# Patient Record
Sex: MALE | Race: WHITE | NOT HISPANIC OR LATINO | Employment: PART TIME | URBAN - METROPOLITAN AREA
[De-identification: names, ages, dates, MRNs, and addresses within clinical notes are randomized per-mention and may not be internally consistent; named-entity substitution may affect disease eponyms.]

---

## 2023-03-06 ENCOUNTER — TELEPHONE (OUTPATIENT)
Dept: PRIMARY CARE | Facility: CLINIC | Age: 73
End: 2023-03-06
Payer: MEDICARE

## 2023-04-05 ENCOUNTER — TELEPHONE (OUTPATIENT)
Dept: PRIMARY CARE | Facility: CLINIC | Age: 73
End: 2023-04-05
Payer: MEDICARE

## 2023-04-05 DIAGNOSIS — E78.2 MIXED HYPERLIPIDEMIA: Primary | ICD-10-CM

## 2023-04-05 RX ORDER — ATORVASTATIN CALCIUM 20 MG/1
20 TABLET, FILM COATED ORAL DAILY
Qty: 90 TABLET | Refills: 2 | Status: SHIPPED | OUTPATIENT
Start: 2023-04-05 | End: 2024-01-30

## 2023-04-05 RX ORDER — ATORVASTATIN CALCIUM 20 MG/1
20 TABLET, FILM COATED ORAL DAILY
COMMUNITY
End: 2023-04-05 | Stop reason: SDUPTHER

## 2023-07-01 DIAGNOSIS — E11.9 TYPE 2 DIABETES MELLITUS WITHOUT COMPLICATION, WITHOUT LONG-TERM CURRENT USE OF INSULIN (MULTI): Primary | ICD-10-CM

## 2023-07-03 RX ORDER — METFORMIN HYDROCHLORIDE 500 MG/1
TABLET ORAL
Qty: 180 TABLET | Refills: 0 | Status: SHIPPED | OUTPATIENT
Start: 2023-07-03 | End: 2023-10-16 | Stop reason: SDUPTHER

## 2023-10-16 DIAGNOSIS — E11.9 TYPE 2 DIABETES MELLITUS WITHOUT COMPLICATION, WITHOUT LONG-TERM CURRENT USE OF INSULIN (MULTI): Primary | ICD-10-CM

## 2023-10-16 RX ORDER — SILDENAFIL 100 MG/1
100 TABLET, FILM COATED ORAL DAILY PRN
Qty: 10 TABLET | Refills: 1 | Status: SHIPPED | OUTPATIENT
Start: 2023-10-16 | End: 2023-10-23 | Stop reason: SDUPTHER

## 2023-10-16 RX ORDER — METFORMIN HYDROCHLORIDE 500 MG/1
500 TABLET ORAL 2 TIMES DAILY
Qty: 180 TABLET | Refills: 1 | Status: SHIPPED | OUTPATIENT
Start: 2023-10-16 | End: 2023-11-07 | Stop reason: SDUPTHER

## 2023-10-16 RX ORDER — SILDENAFIL 100 MG/1
100 TABLET, FILM COATED ORAL DAILY PRN
COMMUNITY
End: 2023-10-16 | Stop reason: SDUPTHER

## 2023-10-23 DIAGNOSIS — E11.9 TYPE 2 DIABETES MELLITUS WITHOUT COMPLICATION, WITHOUT LONG-TERM CURRENT USE OF INSULIN (MULTI): ICD-10-CM

## 2023-10-23 RX ORDER — SILDENAFIL 100 MG/1
100 TABLET, FILM COATED ORAL DAILY PRN
Qty: 10 TABLET | Refills: 1 | Status: SHIPPED | OUTPATIENT
Start: 2023-10-23

## 2023-10-24 PROBLEM — E11.9 DIABETES MELLITUS (MULTI): Status: ACTIVE | Noted: 2023-10-24

## 2023-11-07 ENCOUNTER — LAB (OUTPATIENT)
Dept: LAB | Facility: LAB | Age: 73
End: 2023-11-07
Payer: MEDICARE

## 2023-11-07 ENCOUNTER — OFFICE VISIT (OUTPATIENT)
Dept: PRIMARY CARE | Facility: CLINIC | Age: 73
End: 2023-11-07
Payer: MEDICARE

## 2023-11-07 VITALS
RESPIRATION RATE: 16 BRPM | HEIGHT: 67 IN | SYSTOLIC BLOOD PRESSURE: 104 MMHG | OXYGEN SATURATION: 96 % | DIASTOLIC BLOOD PRESSURE: 62 MMHG | HEART RATE: 72 BPM | BODY MASS INDEX: 26.21 KG/M2 | TEMPERATURE: 98 F | WEIGHT: 167 LBS

## 2023-11-07 DIAGNOSIS — E11.9 TYPE 2 DIABETES MELLITUS WITHOUT COMPLICATION, WITHOUT LONG-TERM CURRENT USE OF INSULIN (MULTI): ICD-10-CM

## 2023-11-07 DIAGNOSIS — Z00.00 ROUTINE GENERAL MEDICAL EXAMINATION AT HEALTH CARE FACILITY: ICD-10-CM

## 2023-11-07 DIAGNOSIS — Z12.5 SCREENING FOR PROSTATE CANCER: ICD-10-CM

## 2023-11-07 DIAGNOSIS — Z79.4 TYPE 2 DIABETES MELLITUS WITHOUT COMPLICATION, WITH LONG-TERM CURRENT USE OF INSULIN (MULTI): ICD-10-CM

## 2023-11-07 DIAGNOSIS — E11.9 TYPE 2 DIABETES MELLITUS WITHOUT COMPLICATION, WITH LONG-TERM CURRENT USE OF INSULIN (MULTI): Primary | ICD-10-CM

## 2023-11-07 DIAGNOSIS — E11.40 TYPE 2 DIABETES MELLITUS WITH DIABETIC NEUROPATHY, WITHOUT LONG-TERM CURRENT USE OF INSULIN (MULTI): ICD-10-CM

## 2023-11-07 DIAGNOSIS — Z79.4 TYPE 2 DIABETES MELLITUS WITHOUT COMPLICATION, WITH LONG-TERM CURRENT USE OF INSULIN (MULTI): Primary | ICD-10-CM

## 2023-11-07 DIAGNOSIS — E11.9 TYPE 2 DIABETES MELLITUS WITHOUT COMPLICATION, WITH LONG-TERM CURRENT USE OF INSULIN (MULTI): ICD-10-CM

## 2023-11-07 LAB
ALBUMIN SERPL BCP-MCNC: 4.4 G/DL (ref 3.4–5)
ALP SERPL-CCNC: 80 U/L (ref 33–136)
ALT SERPL W P-5'-P-CCNC: 26 U/L (ref 10–52)
ANION GAP SERPL CALC-SCNC: 10 MMOL/L (ref 10–20)
AST SERPL W P-5'-P-CCNC: 23 U/L (ref 9–39)
BILIRUB SERPL-MCNC: 0.8 MG/DL (ref 0–1.2)
BUN SERPL-MCNC: 24 MG/DL (ref 6–23)
CALCIUM SERPL-MCNC: 9.3 MG/DL (ref 8.6–10.3)
CHLORIDE SERPL-SCNC: 105 MMOL/L (ref 98–107)
CHOLEST SERPL-MCNC: 142 MG/DL (ref 0–199)
CHOLESTEROL/HDL RATIO: 2.4
CO2 SERPL-SCNC: 28 MMOL/L (ref 21–32)
CREAT SERPL-MCNC: 1.12 MG/DL (ref 0.5–1.3)
CREAT UR-MCNC: 172.6 MG/DL (ref 20–370)
ERYTHROCYTE [DISTWIDTH] IN BLOOD BY AUTOMATED COUNT: 12.4 % (ref 11.5–14.5)
GFR SERPL CREATININE-BSD FRML MDRD: 69 ML/MIN/1.73M*2
GLUCOSE SERPL-MCNC: 156 MG/DL (ref 74–99)
HCT VFR BLD AUTO: 43 % (ref 41–52)
HDLC SERPL-MCNC: 58.5 MG/DL
HGB BLD-MCNC: 14.3 G/DL (ref 13.5–17.5)
LDLC SERPL CALC-MCNC: 67 MG/DL
MCH RBC QN AUTO: 30.8 PG (ref 26–34)
MCHC RBC AUTO-ENTMCNC: 33.3 G/DL (ref 32–36)
MCV RBC AUTO: 93 FL (ref 80–100)
MICROALBUMIN UR-MCNC: 25.6 MG/L
MICROALBUMIN/CREAT UR: 14.8 UG/MG CREAT
NON HDL CHOLESTEROL: 84 MG/DL (ref 0–149)
NRBC BLD-RTO: 0 /100 WBCS (ref 0–0)
PLATELET # BLD AUTO: 180 X10*3/UL (ref 150–450)
POC HEMOGLOBIN A1C: 7.5 % (ref 4.2–6.5)
POTASSIUM SERPL-SCNC: 4.3 MMOL/L (ref 3.5–5.3)
PROT SERPL-MCNC: 6.8 G/DL (ref 6.4–8.2)
PSA SERPL-MCNC: 2.09 NG/ML
RBC # BLD AUTO: 4.65 X10*6/UL (ref 4.5–5.9)
SODIUM SERPL-SCNC: 139 MMOL/L (ref 136–145)
TRIGL SERPL-MCNC: 83 MG/DL (ref 0–149)
VLDL: 17 MG/DL (ref 0–40)
WBC # BLD AUTO: 4.8 X10*3/UL (ref 4.4–11.3)

## 2023-11-07 PROCEDURE — 1159F MED LIST DOCD IN RCRD: CPT | Performed by: FAMILY MEDICINE

## 2023-11-07 PROCEDURE — G0009 ADMIN PNEUMOCOCCAL VACCINE: HCPCS | Performed by: FAMILY MEDICINE

## 2023-11-07 PROCEDURE — 1170F FXNL STATUS ASSESSED: CPT | Performed by: FAMILY MEDICINE

## 2023-11-07 PROCEDURE — 83036 HEMOGLOBIN GLYCOSYLATED A1C: CPT | Performed by: FAMILY MEDICINE

## 2023-11-07 PROCEDURE — 3078F DIAST BP <80 MM HG: CPT | Performed by: FAMILY MEDICINE

## 2023-11-07 PROCEDURE — 80061 LIPID PANEL: CPT

## 2023-11-07 PROCEDURE — 1126F AMNT PAIN NOTED NONE PRSNT: CPT | Performed by: FAMILY MEDICINE

## 2023-11-07 PROCEDURE — 82043 UR ALBUMIN QUANTITATIVE: CPT

## 2023-11-07 PROCEDURE — G0008 ADMIN INFLUENZA VIRUS VAC: HCPCS | Performed by: FAMILY MEDICINE

## 2023-11-07 PROCEDURE — 3074F SYST BP LT 130 MM HG: CPT | Performed by: FAMILY MEDICINE

## 2023-11-07 PROCEDURE — 80053 COMPREHEN METABOLIC PANEL: CPT

## 2023-11-07 PROCEDURE — 36415 COLL VENOUS BLD VENIPUNCTURE: CPT

## 2023-11-07 PROCEDURE — 90662 IIV NO PRSV INCREASED AG IM: CPT | Performed by: FAMILY MEDICINE

## 2023-11-07 PROCEDURE — 1160F RVW MEDS BY RX/DR IN RCRD: CPT | Performed by: FAMILY MEDICINE

## 2023-11-07 PROCEDURE — G0103 PSA SCREENING: HCPCS

## 2023-11-07 PROCEDURE — 85027 COMPLETE CBC AUTOMATED: CPT

## 2023-11-07 PROCEDURE — 90677 PCV20 VACCINE IM: CPT | Performed by: FAMILY MEDICINE

## 2023-11-07 PROCEDURE — 82570 ASSAY OF URINE CREATININE: CPT

## 2023-11-07 PROCEDURE — G0439 PPPS, SUBSEQ VISIT: HCPCS | Performed by: FAMILY MEDICINE

## 2023-11-07 RX ORDER — METFORMIN HYDROCHLORIDE 1000 MG/1
1000 TABLET ORAL 2 TIMES DAILY
Qty: 180 TABLET | Refills: 2 | Status: SHIPPED | OUTPATIENT
Start: 2023-11-07 | End: 2023-11-29 | Stop reason: SDUPTHER

## 2023-11-07 RX ORDER — ASCORBIC ACID 500 MG
1500 TABLET ORAL DAILY
COMMUNITY

## 2023-11-07 ASSESSMENT — ENCOUNTER SYMPTOMS
NEUROLOGICAL NEGATIVE: 1
CONSTITUTIONAL NEGATIVE: 1
CARDIOVASCULAR NEGATIVE: 1
LOSS OF SENSATION IN FEET: 0
RESPIRATORY NEGATIVE: 1
ARTHRALGIAS: 1
OCCASIONAL FEELINGS OF UNSTEADINESS: 0
DEPRESSION: 0
GASTROINTESTINAL NEGATIVE: 1
MYALGIAS: 1
PSYCHIATRIC NEGATIVE: 1

## 2023-11-07 ASSESSMENT — ACTIVITIES OF DAILY LIVING (ADL)
TAKING_MEDICATION: INDEPENDENT
DOING_HOUSEWORK: INDEPENDENT
BATHING: INDEPENDENT
GROCERY_SHOPPING: INDEPENDENT
DRESSING: INDEPENDENT
MANAGING_FINANCES: INDEPENDENT

## 2023-11-07 ASSESSMENT — PATIENT HEALTH QUESTIONNAIRE - PHQ9
1. LITTLE INTEREST OR PLEASURE IN DOING THINGS: NOT AT ALL
SUM OF ALL RESPONSES TO PHQ9 QUESTIONS 1 AND 2: 0
2. FEELING DOWN, DEPRESSED OR HOPELESS: NOT AT ALL

## 2023-11-07 NOTE — PROGRESS NOTES
"Subjective   Reason for Visit: Wilfred Garces is an 73 y.o. male here for a Medicare Wellness visit.          Reviewed all medications by prescribing practitioner or clinical pharmacist (such as prescriptions, OTCs, herbal therapies and supplements) and documented in the medical record.    Patient doing okay no chest pain or shortness of breath.  Normal bowel bladder.  Still taking his meds for Lyme.  Patient has had pain in his foot.  At the ball he has calluses.  He is seeing podiatry in the past.  Patient doing okay with his medications.  He is staying active.  He rides his bike, he walks coughing as much as he can.  Minor aches and pains.        Patient Care Team:  Sterling Valentin DO as PCP - General  Sterling Valentin DO as PCP - [facility] Attributed Provider     Review of Systems   Constitutional: Negative.    HENT: Negative.     Respiratory: Negative.     Cardiovascular: Negative.    Gastrointestinal: Negative.    Genitourinary: Negative.    Musculoskeletal:  Positive for arthralgias and myalgias.   Neurological: Negative.    Psychiatric/Behavioral: Negative.         Objective   Vitals:  /62 (BP Location: Left arm, Patient Position: Sitting, BP Cuff Size: Adult)   Pulse 72   Temp 36.7 °C (98 °F)   Resp 16   Ht 1.708 m (5' 7.25\")   Wt 75.8 kg (167 lb)   SpO2 96%   BMI 25.96 kg/m²       Physical Exam  Vitals and nursing note reviewed.   Constitutional:       General: He is not in acute distress.     Appearance: Normal appearance. He is not ill-appearing.   HENT:      Head: Normocephalic.      Right Ear: Tympanic membrane and ear canal normal.      Left Ear: Tympanic membrane and ear canal normal.      Nose: Nose normal.      Mouth/Throat:      Mouth: Mucous membranes are moist.      Pharynx: Oropharynx is clear.   Eyes:      Extraocular Movements: Extraocular movements intact.      Conjunctiva/sclera: Conjunctivae normal.      Pupils: Pupils are equal, round, and reactive to light.   Cardiovascular:    "   Rate and Rhythm: Normal rate and regular rhythm.      Pulses: Normal pulses.   Pulmonary:      Effort: Pulmonary effort is normal. No respiratory distress.      Breath sounds: No wheezing, rhonchi or rales.   Abdominal:      General: Bowel sounds are normal.      Palpations: Abdomen is soft.      Tenderness: There is no guarding.      Hernia: No hernia is present.   Musculoskeletal:         General: Normal range of motion.      Cervical back: Neck supple.      Right lower leg: No edema.      Left lower leg: No edema.   Skin:     General: Skin is warm.      Capillary Refill: Capillary refill takes less than 2 seconds.      Comments: Patient does have bilateral calluses on his feet.  Some loss of the fat pad.  Hammertoe of the second digit on the left.  No sores no ulcers, sensation intact   Neurological:      General: No focal deficit present.      Mental Status: He is oriented to person, place, and time.      Cranial Nerves: No cranial nerve deficit.      Sensory: No sensory deficit.      Gait: Gait normal.      Deep Tendon Reflexes: Reflexes normal.   Psychiatric:         Mood and Affect: Mood normal.         Behavior: Behavior normal.         Judgment: Judgment normal.         Assessment/Plan   Problem List Items Addressed This Visit       Type 2 diabetes mellitus with diabetic neuropathy, without long-term current use of insulin (CMS/[facility]) - Primary    Current Assessment & Plan     Hemoglobin A1c 7.5    Increase his metformin dose to thousand twice a day         Relevant Medications    metFORMIN (Glucophage) 1,000 mg tablet     Other Visit Diagnoses       Routine general medical examination at health care facility        Relevant Orders    1 Year Follow Up In [facility] - PCP - Wellness Exam    CBC    Comprehensive Metabolic Panel    Lipid Panel    Albumin , Urine Random    Screening for prostate cancer        Relevant Orders    Prostate Specific Antigen, Screen        Patient is get his RSV COVID and  Shingrix at the pharmacy.  We will update his flu and pneumonia vaccine today.  Patient to continue to watch his diet.  He states he is making a lot of candy.  He can increase his metformin to thousand twice a day.  Patient is going to follow-up in 3 months.  He is to monitor his blood glucose.  Patient aware if any chest pain shortness of breath any nausea vomiting diarrhea fever headache any concerning symptoms go to ER

## 2023-11-08 NOTE — RESULT ENCOUNTER NOTE
Patient's glucose is up at 156.  We will see if increasing the metformin helps.  His kidney and liver function appear fine  His PSA is 2.09 last year is 2.06 this is good.  Patient's total cholesterol is 142 this is good continue the medication   patient's blood counts are fine

## 2023-11-13 ENCOUNTER — TELEPHONE (OUTPATIENT)
Dept: PRIMARY CARE | Facility: CLINIC | Age: 73
End: 2023-11-13
Payer: MEDICARE

## 2023-11-28 ENCOUNTER — TELEPHONE (OUTPATIENT)
Dept: PRIMARY CARE | Facility: CLINIC | Age: 73
End: 2023-11-28

## 2023-11-28 NOTE — TELEPHONE ENCOUNTER
Pt says he started taking metformin pt said he can not tolerate the med due to diarrhea. Is there something else he can use to get his A1C down please advise.

## 2023-11-29 DIAGNOSIS — E11.9 TYPE 2 DIABETES MELLITUS WITHOUT COMPLICATION, WITHOUT LONG-TERM CURRENT USE OF INSULIN (MULTI): ICD-10-CM

## 2023-11-29 DIAGNOSIS — E11.9 TYPE 2 DIABETES MELLITUS WITHOUT COMPLICATION, WITH LONG-TERM CURRENT USE OF INSULIN (MULTI): Primary | ICD-10-CM

## 2023-11-29 DIAGNOSIS — Z79.4 TYPE 2 DIABETES MELLITUS WITHOUT COMPLICATION, WITH LONG-TERM CURRENT USE OF INSULIN (MULTI): Primary | ICD-10-CM

## 2023-11-29 RX ORDER — METFORMIN HYDROCHLORIDE 500 MG/1
500 TABLET ORAL 2 TIMES DAILY
Qty: 60 TABLET | Refills: 1 | Status: SHIPPED | OUTPATIENT
Start: 2023-11-29

## 2024-01-30 DIAGNOSIS — E11.9 TYPE 2 DIABETES MELLITUS WITHOUT COMPLICATION, WITH LONG-TERM CURRENT USE OF INSULIN (MULTI): ICD-10-CM

## 2024-01-30 DIAGNOSIS — Z79.4 TYPE 2 DIABETES MELLITUS WITHOUT COMPLICATION, WITH LONG-TERM CURRENT USE OF INSULIN (MULTI): ICD-10-CM

## 2024-01-30 DIAGNOSIS — E78.2 MIXED HYPERLIPIDEMIA: ICD-10-CM

## 2024-01-30 RX ORDER — ATORVASTATIN CALCIUM 20 MG/1
20 TABLET, FILM COATED ORAL DAILY
Qty: 90 TABLET | Refills: 2 | Status: SHIPPED | OUTPATIENT
Start: 2024-01-30 | End: 2024-02-01 | Stop reason: SDUPTHER

## 2024-02-01 DIAGNOSIS — E78.2 MIXED HYPERLIPIDEMIA: ICD-10-CM

## 2024-02-01 RX ORDER — ATORVASTATIN CALCIUM 20 MG/1
20 TABLET, FILM COATED ORAL DAILY
Qty: 90 TABLET | Refills: 2 | Status: SHIPPED | OUTPATIENT
Start: 2024-02-01

## 2024-02-01 NOTE — TELEPHONE ENCOUNTER
Pt says he has been taking Januvia since november however it is going to cost him 400$ he doesn't want to take it if it is going to cost that much.

## 2024-08-23 ENCOUNTER — TELEPHONE (OUTPATIENT)
Dept: PRIMARY CARE | Facility: CLINIC | Age: 74
End: 2024-08-23
Payer: MEDICARE

## 2024-08-23 DIAGNOSIS — E11.9 TYPE 2 DIABETES MELLITUS WITHOUT COMPLICATION, WITHOUT LONG-TERM CURRENT USE OF INSULIN (MULTI): ICD-10-CM

## 2024-08-23 RX ORDER — SILDENAFIL 100 MG/1
100 TABLET, FILM COATED ORAL DAILY PRN
Qty: 10 TABLET | Refills: 1 | Status: SHIPPED | OUTPATIENT
Start: 2024-08-23

## 2024-08-23 NOTE — TELEPHONE ENCOUNTER
Rx Refill Request Telephone Encounter    Name:  Wilfred Garces  :  [id number]  Medication Name:  sildenafil           Specific Pharmacy location:  [facility] [address] [place] [address]   Date of last appointment:  2023  Date of next appointment:  2024   Best number to reach patient:

## 2024-08-27 ENCOUNTER — TELEPHONE (OUTPATIENT)
Dept: PRIMARY CARE | Facility: CLINIC | Age: 74
End: 2024-08-27
Payer: MEDICARE

## 2024-08-28 DIAGNOSIS — E11.9 TYPE 2 DIABETES MELLITUS WITHOUT COMPLICATION, WITHOUT LONG-TERM CURRENT USE OF INSULIN (MULTI): ICD-10-CM

## 2024-08-28 RX ORDER — SILDENAFIL 100 MG/1
100 TABLET, FILM COATED ORAL DAILY PRN
Qty: 10 TABLET | Refills: 1 | Status: SHIPPED | OUTPATIENT
Start: 2024-08-28

## 2024-09-20 DIAGNOSIS — E11.9 TYPE 2 DIABETES MELLITUS WITHOUT COMPLICATION, WITHOUT LONG-TERM CURRENT USE OF INSULIN (MULTI): ICD-10-CM

## 2024-09-20 RX ORDER — METFORMIN HYDROCHLORIDE 500 MG/1
500 TABLET ORAL 2 TIMES DAILY
Qty: 60 TABLET | Refills: 11 | Status: SHIPPED | OUTPATIENT
Start: 2024-09-20

## 2024-10-14 DIAGNOSIS — E78.2 MIXED HYPERLIPIDEMIA: ICD-10-CM

## 2024-10-14 RX ORDER — ATORVASTATIN CALCIUM 20 MG/1
20 TABLET, FILM COATED ORAL DAILY
Qty: 90 TABLET | Refills: 3 | Status: SHIPPED | OUTPATIENT
Start: 2024-10-14

## 2024-11-12 ENCOUNTER — APPOINTMENT (OUTPATIENT)
Dept: PRIMARY CARE | Facility: CLINIC | Age: 74
End: 2024-11-12
Payer: MEDICARE

## 2024-11-12 ENCOUNTER — LAB (OUTPATIENT)
Dept: LAB | Facility: LAB | Age: 74
End: 2024-11-12
Payer: MEDICARE

## 2024-11-12 VITALS
TEMPERATURE: 98 F | WEIGHT: 168 LBS | HEIGHT: 68 IN | BODY MASS INDEX: 25.46 KG/M2 | SYSTOLIC BLOOD PRESSURE: 102 MMHG | DIASTOLIC BLOOD PRESSURE: 66 MMHG | RESPIRATION RATE: 16 BRPM | OXYGEN SATURATION: 96 % | HEART RATE: 60 BPM

## 2024-11-12 DIAGNOSIS — E11.40 TYPE 2 DIABETES MELLITUS WITH DIABETIC NEUROPATHY, WITHOUT LONG-TERM CURRENT USE OF INSULIN: ICD-10-CM

## 2024-11-12 DIAGNOSIS — E11.9 TYPE 2 DIABETES MELLITUS WITHOUT COMPLICATION, WITHOUT LONG-TERM CURRENT USE OF INSULIN (MULTI): ICD-10-CM

## 2024-11-12 DIAGNOSIS — Z00.00 ROUTINE GENERAL MEDICAL EXAMINATION AT HEALTH CARE FACILITY: Primary | ICD-10-CM

## 2024-11-12 DIAGNOSIS — Z12.5 SCREENING FOR PROSTATE CANCER: ICD-10-CM

## 2024-11-12 DIAGNOSIS — Z00.00 ROUTINE GENERAL MEDICAL EXAMINATION AT HEALTH CARE FACILITY: ICD-10-CM

## 2024-11-12 LAB
ALBUMIN SERPL BCP-MCNC: 4.4 G/DL (ref 3.4–5)
ALP SERPL-CCNC: 92 U/L (ref 33–136)
ALT SERPL W P-5'-P-CCNC: 22 U/L (ref 10–52)
ANION GAP SERPL CALC-SCNC: 15 MMOL/L (ref 10–20)
AST SERPL W P-5'-P-CCNC: 18 U/L (ref 9–39)
BILIRUB SERPL-MCNC: 0.6 MG/DL (ref 0–1.2)
BUN SERPL-MCNC: 16 MG/DL (ref 6–23)
CALCIUM SERPL-MCNC: 9.6 MG/DL (ref 8.6–10.6)
CHLORIDE SERPL-SCNC: 103 MMOL/L (ref 98–107)
CHOLEST SERPL-MCNC: 144 MG/DL (ref 0–199)
CHOLESTEROL/HDL RATIO: 2.6
CO2 SERPL-SCNC: 29 MMOL/L (ref 21–32)
CREAT SERPL-MCNC: 0.95 MG/DL (ref 0.5–1.3)
CREAT UR-MCNC: 115.8 MG/DL (ref 20–370)
EGFRCR SERPLBLD CKD-EPI 2021: 84 ML/MIN/1.73M*2
ERYTHROCYTE [DISTWIDTH] IN BLOOD BY AUTOMATED COUNT: 12.4 % (ref 11.5–14.5)
GLUCOSE SERPL-MCNC: 160 MG/DL (ref 74–99)
HCT VFR BLD AUTO: 42.8 % (ref 41–52)
HDLC SERPL-MCNC: 55.5 MG/DL
HGB BLD-MCNC: 14.2 G/DL (ref 13.5–17.5)
LDLC SERPL CALC-MCNC: 70 MG/DL
MCH RBC QN AUTO: 31.1 PG (ref 26–34)
MCHC RBC AUTO-ENTMCNC: 33.2 G/DL (ref 32–36)
MCV RBC AUTO: 94 FL (ref 80–100)
MICROALBUMIN UR-MCNC: 13.5 MG/L
MICROALBUMIN/CREAT UR: 11.7 UG/MG CREAT
NON HDL CHOLESTEROL: 89 MG/DL (ref 0–149)
NRBC BLD-RTO: 0 /100 WBCS (ref 0–0)
PLATELET # BLD AUTO: 185 X10*3/UL (ref 150–450)
POC HEMOGLOBIN A1C: 8.1 % (ref 4.2–6.5)
POTASSIUM SERPL-SCNC: 4.5 MMOL/L (ref 3.5–5.3)
PROT SERPL-MCNC: 7 G/DL (ref 6.4–8.2)
PSA SERPL-MCNC: 2.33 NG/ML
RBC # BLD AUTO: 4.57 X10*6/UL (ref 4.5–5.9)
SODIUM SERPL-SCNC: 142 MMOL/L (ref 136–145)
TRIGL SERPL-MCNC: 94 MG/DL (ref 0–149)
VLDL: 19 MG/DL (ref 0–40)
WBC # BLD AUTO: 4.9 X10*3/UL (ref 4.4–11.3)

## 2024-11-12 PROCEDURE — 83036 HEMOGLOBIN GLYCOSYLATED A1C: CPT | Performed by: FAMILY MEDICINE

## 2024-11-12 PROCEDURE — G0103 PSA SCREENING: HCPCS

## 2024-11-12 PROCEDURE — 80053 COMPREHEN METABOLIC PANEL: CPT

## 2024-11-12 PROCEDURE — 80061 LIPID PANEL: CPT

## 2024-11-12 PROCEDURE — 36415 COLL VENOUS BLD VENIPUNCTURE: CPT

## 2024-11-12 PROCEDURE — 1036F TOBACCO NON-USER: CPT | Performed by: FAMILY MEDICINE

## 2024-11-12 PROCEDURE — 85027 COMPLETE CBC AUTOMATED: CPT

## 2024-11-12 PROCEDURE — 3074F SYST BP LT 130 MM HG: CPT | Performed by: FAMILY MEDICINE

## 2024-11-12 PROCEDURE — 3008F BODY MASS INDEX DOCD: CPT | Performed by: FAMILY MEDICINE

## 2024-11-12 PROCEDURE — 1123F ACP DISCUSS/DSCN MKR DOCD: CPT | Performed by: FAMILY MEDICINE

## 2024-11-12 PROCEDURE — 82043 UR ALBUMIN QUANTITATIVE: CPT

## 2024-11-12 PROCEDURE — 1157F ADVNC CARE PLAN IN RCRD: CPT | Performed by: FAMILY MEDICINE

## 2024-11-12 PROCEDURE — G0439 PPPS, SUBSEQ VISIT: HCPCS | Performed by: FAMILY MEDICINE

## 2024-11-12 PROCEDURE — 1158F ADVNC CARE PLAN TLK DOCD: CPT | Performed by: FAMILY MEDICINE

## 2024-11-12 PROCEDURE — 3078F DIAST BP <80 MM HG: CPT | Performed by: FAMILY MEDICINE

## 2024-11-12 PROCEDURE — 82570 ASSAY OF URINE CREATININE: CPT

## 2024-11-12 RX ORDER — DAPAGLIFLOZIN 5 MG/1
5 TABLET, FILM COATED ORAL DAILY
Qty: 30 TABLET | Refills: 2 | Status: SHIPPED | OUTPATIENT
Start: 2024-11-12

## 2024-11-12 RX ORDER — FERROUS SULFATE 325(65) MG
325 TABLET ORAL
COMMUNITY

## 2024-11-12 RX ORDER — DAPAGLIFLOZIN 5 MG/1
5 TABLET, FILM COATED ORAL DAILY
Qty: 30 TABLET | Refills: 1 | Status: SHIPPED | OUTPATIENT
Start: 2024-11-12

## 2024-11-12 ASSESSMENT — ENCOUNTER SYMPTOMS
WEAKNESS: 0
DIARRHEA: 0
OCCASIONAL FEELINGS OF UNSTEADINESS: 0
ENDOCRINE NEGATIVE: 1
DIZZINESS: 0
SHORTNESS OF BREATH: 0
FEVER: 0
WHEEZING: 0
CONSTIPATION: 0
ABDOMINAL PAIN: 0
ALLERGIC/IMMUNOLOGIC NEGATIVE: 1
ARTHRALGIAS: 1
HEMATOLOGIC/LYMPHATIC NEGATIVE: 1
COUGH: 0
PSYCHIATRIC NEGATIVE: 1
LOSS OF SENSATION IN FEET: 0
HEADACHES: 0
DEPRESSION: 0
EYES NEGATIVE: 1
NUMBNESS: 0
VOMITING: 0

## 2024-11-12 ASSESSMENT — PATIENT HEALTH QUESTIONNAIRE - PHQ9
1. LITTLE INTEREST OR PLEASURE IN DOING THINGS: NOT AT ALL
2. FEELING DOWN, DEPRESSED OR HOPELESS: NOT AT ALL
SUM OF ALL RESPONSES TO PHQ9 QUESTIONS 1 AND 2: 0

## 2024-11-13 NOTE — RESULT ENCOUNTER NOTE
Mr Garces your liver and kidney function appear fine  Your total cholesterol is 144 the.  This is good continue the medication  Your white blood cell count is normal your hemoglobin is normal, no anemia  Your PSA is 2.33 last year was 2.09.  So this is good  Will continue the previous medications and start the new diabetes medication.  Will recheck your hemoglobin A1c in 3 to 4 months

## 2025-03-11 ENCOUNTER — APPOINTMENT (OUTPATIENT)
Dept: PRIMARY CARE | Facility: CLINIC | Age: 75
End: 2025-03-11
Payer: MEDICARE

## 2025-03-11 VITALS
SYSTOLIC BLOOD PRESSURE: 116 MMHG | RESPIRATION RATE: 16 BRPM | OXYGEN SATURATION: 97 % | HEIGHT: 68 IN | TEMPERATURE: 98.1 F | HEART RATE: 76 BPM | WEIGHT: 178 LBS | DIASTOLIC BLOOD PRESSURE: 72 MMHG | BODY MASS INDEX: 26.98 KG/M2

## 2025-03-11 DIAGNOSIS — F41.9 ANXIETY: ICD-10-CM

## 2025-03-11 DIAGNOSIS — M79.672 FOOT PAIN, LEFT: ICD-10-CM

## 2025-03-11 DIAGNOSIS — E11.9 TYPE 2 DIABETES MELLITUS WITHOUT COMPLICATION, WITHOUT LONG-TERM CURRENT USE OF INSULIN (MULTI): ICD-10-CM

## 2025-03-11 DIAGNOSIS — E11.40 TYPE 2 DIABETES MELLITUS WITH DIABETIC NEUROPATHY, WITHOUT LONG-TERM CURRENT USE OF INSULIN: Primary | ICD-10-CM

## 2025-03-11 LAB — POC HEMOGLOBIN A1C: 8.3 % (ref 4.2–6.5)

## 2025-03-11 PROCEDURE — 3008F BODY MASS INDEX DOCD: CPT | Performed by: FAMILY MEDICINE

## 2025-03-11 PROCEDURE — 83036 HEMOGLOBIN GLYCOSYLATED A1C: CPT | Performed by: FAMILY MEDICINE

## 2025-03-11 PROCEDURE — 3078F DIAST BP <80 MM HG: CPT | Performed by: FAMILY MEDICINE

## 2025-03-11 PROCEDURE — 1159F MED LIST DOCD IN RCRD: CPT | Performed by: FAMILY MEDICINE

## 2025-03-11 PROCEDURE — 99214 OFFICE O/P EST MOD 30 MIN: CPT | Performed by: FAMILY MEDICINE

## 2025-03-11 PROCEDURE — 1157F ADVNC CARE PLAN IN RCRD: CPT | Performed by: FAMILY MEDICINE

## 2025-03-11 PROCEDURE — G2211 COMPLEX E/M VISIT ADD ON: HCPCS | Performed by: FAMILY MEDICINE

## 2025-03-11 PROCEDURE — 3074F SYST BP LT 130 MM HG: CPT | Performed by: FAMILY MEDICINE

## 2025-03-11 RX ORDER — DAPAGLIFLOZIN 5 MG/1
5 TABLET, FILM COATED ORAL DAILY
Qty: 30 TABLET | Refills: 1 | Status: SHIPPED | OUTPATIENT
Start: 2025-03-11 | End: 2026-04-15

## 2025-03-11 ASSESSMENT — ENCOUNTER SYMPTOMS
CARDIOVASCULAR NEGATIVE: 1
CONSTITUTIONAL NEGATIVE: 1
RESPIRATORY NEGATIVE: 1
ARTHRALGIAS: 1
NERVOUS/ANXIOUS: 1

## 2025-03-11 NOTE — PROGRESS NOTES
"Subjective   Patient ID: Wilfred Garces is a 74 y.o. male who presents for Diabetes and Anxiety.    Patient has noticed increased anxiety.  However the last 3 weeks this is improved.  Patient was worrying more about things and may happen in the future.  He said this happened a few years ago got under control.  Patient feels like the last 3 weeks when the weather got nice to her that it is really gotten better.  He is can continue to look for ways to try to help with different types of techniques.  Patient denies any chest pain or shortness breath no numbness tingling no neuropathy like symptoms.  Patient does have some pain on his foot.  He has a little ball on his arch.  This has a callus that hurts him.  He would like to see podiatry.  Patient states he has been trying to watch what he eats, trying to stay active.  His glucose numbers are still running high.  No leg swelling with no sores on his feet,    Diabetes  Hypoglycemia symptoms include nervousness/anxiousness.   Anxiety  Symptoms include nervous/anxious behavior.            Review of Systems   Constitutional: Negative.    Respiratory: Negative.     Cardiovascular: Negative.    Musculoskeletal:  Positive for arthralgias.   Psychiatric/Behavioral:  The patient is nervous/anxious.        Objective   /72 (BP Location: Left arm, Patient Position: Sitting, BP Cuff Size: Adult)   Pulse 76   Temp 36.7 °C (98.1 °F)   Resp 16   Ht 1.715 m (5' 7.5\")   Wt 80.7 kg (178 lb)   SpO2 97%   BMI 27.47 kg/m²     Physical Exam  Vitals reviewed.   Constitutional:       General: He is not in acute distress.     Appearance: Normal appearance. He is well-developed.   HENT:      Head: Normocephalic.   Eyes:      Conjunctiva/sclera: Conjunctivae normal.   Cardiovascular:      Rate and Rhythm: Normal rate and regular rhythm.      Heart sounds: Normal heart sounds.   Pulmonary:      Effort: Pulmonary effort is normal.      Breath sounds: Normal breath sounds. "   Musculoskeletal:      Comments: Patient's diabetic foot exam shows normal sensation normal pulses.  He does have a callus between his arch has a slight improvement from it.   Skin:     Findings: No rash.   Neurological:      Mental Status: He is alert.   Psychiatric:         Mood and Affect: Mood normal.         Behavior: Behavior normal.         Assessment/Plan   Problem List Items Addressed This Visit       RESOLVED: Type 2 diabetes mellitus with diabetic neuropathy, without long-term current use of insulin - Primary     Adjusting treatment plan         Relevant Orders    POCT glycosylated hemoglobin (Hb A1C) manually resulted (Completed)     Other Visit Diagnoses       Type 2 diabetes mellitus without complication, without long-term current use of insulin (Multi)        Foot pain, left        Anxiety            Patient does not have any neuropathy.  Will add Farxiga 5 mg have him see [facility].  He will use a Dexcom meter to better  his glucose numbers     Patient will see podiatrist for his feet.  Will start Farxiga will have him see pharmacy.  Likely will need another medication.  Patient aware if any chest pain shortness of breath any nausea vomit diarrhea fever any numbness tingling any swelling rash and genital area any UTI or yeastlike infections notify the office or go to ER.  Follow-up in 2 to 3 months  Patient is can monitor anxiety.  Right now he does not want to do anything feels better.  If continues may need therapy and/or medication

## 2025-03-12 DIAGNOSIS — E11.9 TYPE 2 DIABETES MELLITUS WITHOUT COMPLICATION, WITHOUT LONG-TERM CURRENT USE OF INSULIN (MULTI): Primary | ICD-10-CM

## 2025-03-20 ENCOUNTER — APPOINTMENT (OUTPATIENT)
Dept: PHARMACY | Facility: HOSPITAL | Age: 75
End: 2025-03-20
Payer: MEDICARE

## 2025-03-20 DIAGNOSIS — E11.9 TYPE 2 DIABETES MELLITUS WITHOUT COMPLICATION, WITHOUT LONG-TERM CURRENT USE OF INSULIN (MULTI): ICD-10-CM

## 2025-03-20 PROCEDURE — RXMED WILLOW AMBULATORY MEDICATION CHARGE

## 2025-03-20 RX ORDER — DAPAGLIFLOZIN 5 MG/1
5 TABLET, FILM COATED ORAL DAILY
Qty: 30 TABLET | Refills: 0 | Status: SHIPPED | OUTPATIENT
Start: 2025-03-20 | End: 2025-04-21

## 2025-03-20 NOTE — PROGRESS NOTES
Patient ID: Wilfred Garces is a 74 y.o. male who presents for initial visit.    Patient was referred to clinical pharmacy for management of type 2 diabetes.    Referring provider: Sterling Valentin, DO   Subjective         Diabetes Assessment    Diet:    Breakfast: hard boiled egg, toast, banana   Lunch: fruit, scacks   Dinner: home cooked meal   Snacks often: sweets   Drinks: water; occasional beer    Exercise: Exercises extensively. 10 mile bike rides. Golfs often. Discussed ADA recommendation of 150 minutes per week of moderate-intensity exercise     No Known Allergies    Current DM pharmacotherapy:   Metformin 500 mg twice daily    Secondary prevention:  Statin? Yes - atorvastatin 20 mg daily  ACE-I/ARB? No  Aspirin? No    Current monitoring regimen:   Patient is using: continuous glucose monitor    SMBG readings: averages 200 mg/dL, lowest 170 mg/dL in the afternoon.    Any episodes of hypoglycemia? No  Hypoglycemia awareness? Yes    Pertinent PMH review:  PMH of Pancreatitis: No  PMH/FH of Medullary Thyroid Cancer: No  PMH/FH of Multiple Endocrine Neoplasia (MEN) Type II: No  PMH of Retinopathy: No  PMH of Urinary Tract Infections/Yeast Infections: No    Patient goals:  Fasting B - 130 mg/dL  Postprandial BG: less than 180 mg/dL  A1c less than 7%    Drug Interactions:  No significant drug-drug interactions exist that require adjustment to therapy      Medication Review  Current Outpatient Medications   Medication Instructions    ascorbic acid (VITAMIN C) 1,500 mg, Daily    atorvastatin (LIPITOR) 20 mg, oral, Daily    dapagliflozin propanediol (FARXIGA) 5 mg, oral, Daily    ferrous sulfate 325 mg, Daily with breakfast    metFORMIN (GLUCOPHAGE) 500 mg, oral, 2 times daily    sildenafil (VIAGRA) 100 mg, oral, Daily PRN      No issues reported in regards to accessibility, adherence, adverse effects, or organization.    Objective   Lab Review  Lab Results   Component Value Date    HGBA1C 8.3 (A) 2025     "  Lab Results   Component Value Date    EGFR 84 11/12/2024    EGFR 69 11/07/2023     No components found for: \"UACR\"  Lab Results   Component Value Date    TRIG 94 11/12/2024    CHOL 144 11/12/2024    LDLCALC 70 11/12/2024    HDL 55.5 11/12/2024     Lab Results   Component Value Date    BILITOT 0.6 11/12/2024    CALCIUM 9.6 11/12/2024    CO2 29 11/12/2024     11/12/2024    CREATININE 0.95 11/12/2024    GLUCOSE 160 (H) 11/12/2024    ALKPHOS 92 11/12/2024    K 4.5 11/12/2024    PROT 7.0 11/12/2024     11/12/2024    AST 18 11/12/2024    ALT 22 11/12/2024    BUN 16 11/12/2024    ANIONGAP 15 11/12/2024    MG 2.10 04/18/2019    ALBUMIN 4.4 11/12/2024     BP Readings from Last 3 Encounters:   03/11/25 116/72   11/12/24 102/66   11/07/23 104/62     BMI Readings from Last 1 Encounters:   03/11/25 27.47 kg/m²     The 10-year ASCVD risk score (Rubio BANEGAS, et al., 2019) is: 30.6%    Values used to calculate the score:      Age: 74 years      Sex: Male      Is Non- : No      Diabetic: Yes      Tobacco smoker: No      Systolic Blood Pressure: 116 mmHg      Is BP treated: No      HDL Cholesterol: 55.5 mg/dL      Total Cholesterol: 144 mg/dL    PATIENT EDUCATION/GOALS  Counseled patient on Farxiga MOA, expectations, side effects, duration of therapy, administration, and monitoring parameters.  Reviewed the benefits of SGLT-2i therapy, such as glycemic control and kidney and CV protection.  Advised patient to practice proper  hygiene to reduce risk of UTIs or yeast infections.  Advised patient to maintain adequate fluid intake to remain hydrated while on SGLT2i therapy.  Answered all patient questions and concerns.   Counseled patient on MOA, expectations, side effects, duration of therapy, contraindications, administration, and monitoring parameters  Answered all patient questions and concerns; provided [facility] phone number if issues/questions arise    Assessment/Plan   Problem List Items Addressed " This Visit    None  Visit Diagnoses       Type 2 diabetes mellitus without complication, without long-term current use of insulin (Multi)        Relevant Medications    dapagliflozin propanediol (Farxiga) 5 mg    Other Relevant Orders    Referral to Clinical Pharmacy          Type 2 diabetes mellitus is not at goal.   Current A1C: 8.3% (3/11/2025)  Goal A1C: <7%    Discussion: Wilfred is doing well at this time. The upfront cost of Farxiga / Jardiance is financially limiting - upon discussion of patient assistance, patient states he makes more than income threshold. Discussed current cost of Farxiga is due to his $590 deductible and that subsequent fills will likely be $0 - 30. Listed available options including paying deductible upfront or enrolling patient into Medicare Payment Plan - patient would prefer to enroll in plan. Wilfred called his insurance and successfully enrolled, order sent to [facility] for delivery. Re-discussed adverse effects of Farxiga, including s/sx of UTI.  Plan:   Initiate:   Farxiga 5 mg once daily in the morning  Continue:   Metformin  mg twice daily  Follow-up: 4 weeks; 4/17 @ 11am  Future considerations: tolerance / titration of Farxiga  Refills: Yes, re-order Farxiga to  [place]    Miky Garcia, PharmD  PGY-1 Pharmacy Resident

## 2025-03-25 ENCOUNTER — PHARMACY VISIT (OUTPATIENT)
Dept: PHARMACY | Facility: CLINIC | Age: 75
End: 2025-03-25
Payer: COMMERCIAL

## 2025-03-31 DIAGNOSIS — E11.9 TYPE 2 DIABETES MELLITUS WITHOUT COMPLICATION, WITHOUT LONG-TERM CURRENT USE OF INSULIN: ICD-10-CM

## 2025-03-31 RX ORDER — METFORMIN HYDROCHLORIDE 500 MG/1
500 TABLET ORAL 2 TIMES DAILY
Qty: 180 TABLET | Refills: 1 | Status: SHIPPED | OUTPATIENT
Start: 2025-03-31 | End: 2025-04-01 | Stop reason: SDUPTHER

## 2025-04-01 DIAGNOSIS — E11.9 TYPE 2 DIABETES MELLITUS WITHOUT COMPLICATION, WITHOUT LONG-TERM CURRENT USE OF INSULIN: ICD-10-CM

## 2025-04-01 RX ORDER — METFORMIN HYDROCHLORIDE 500 MG/1
500 TABLET ORAL 2 TIMES DAILY
Qty: 180 TABLET | Refills: 1 | Status: SHIPPED | OUTPATIENT
Start: 2025-04-01

## 2025-04-08 ENCOUNTER — OFFICE VISIT (OUTPATIENT)
Dept: URGENT CARE | Age: 75
End: 2025-04-08
Payer: MEDICARE

## 2025-04-08 VITALS
SYSTOLIC BLOOD PRESSURE: 135 MMHG | WEIGHT: 170 LBS | DIASTOLIC BLOOD PRESSURE: 67 MMHG | HEART RATE: 70 BPM | TEMPERATURE: 97.3 F | OXYGEN SATURATION: 98 % | RESPIRATION RATE: 16 BRPM | BODY MASS INDEX: 26.23 KG/M2

## 2025-04-08 DIAGNOSIS — R05.1 ACUTE COUGH: ICD-10-CM

## 2025-04-08 DIAGNOSIS — J06.9 UPPER RESPIRATORY TRACT INFECTION, UNSPECIFIED TYPE: Primary | ICD-10-CM

## 2025-04-08 PROBLEM — R73.09 ELEVATED HEMOGLOBIN A1C: Status: ACTIVE | Noted: 2025-04-08

## 2025-04-08 PROBLEM — G62.9 PERIPHERAL NERVE DISEASE: Status: ACTIVE | Noted: 2025-04-08

## 2025-04-08 PROBLEM — E78.5 DYSLIPIDEMIA: Status: ACTIVE | Noted: 2025-04-08

## 2025-04-08 PROBLEM — D64.9 ANEMIA: Status: ACTIVE | Noted: 2025-04-08

## 2025-04-08 PROBLEM — R93.1 AGATSTON CORONARY ARTERY CALCIUM SCORE BETWEEN 100 AND 199: Status: ACTIVE | Noted: 2025-04-08

## 2025-04-08 PROBLEM — J20.9 ACUTE BRONCHITIS WITH BRONCHOSPASM: Status: ACTIVE | Noted: 2025-04-08

## 2025-04-08 PROBLEM — R06.09 DOE (DYSPNEA ON EXERTION): Status: ACTIVE | Noted: 2025-04-08

## 2025-04-08 PROBLEM — N52.9 ERECTILE DYSFUNCTION: Status: ACTIVE | Noted: 2025-04-08

## 2025-04-08 PROBLEM — R00.1 SINUS BRADYCARDIA: Status: ACTIVE | Noted: 2025-04-08

## 2025-04-08 PROBLEM — E11.9 DIABETES MELLITUS (MULTI): Status: ACTIVE | Noted: 2025-04-08

## 2025-04-08 PROCEDURE — 1036F TOBACCO NON-USER: CPT | Performed by: NURSE PRACTITIONER

## 2025-04-08 PROCEDURE — 99203 OFFICE O/P NEW LOW 30 MIN: CPT | Performed by: NURSE PRACTITIONER

## 2025-04-08 PROCEDURE — 1159F MED LIST DOCD IN RCRD: CPT | Performed by: NURSE PRACTITIONER

## 2025-04-08 PROCEDURE — 3078F DIAST BP <80 MM HG: CPT | Performed by: NURSE PRACTITIONER

## 2025-04-08 PROCEDURE — 3075F SYST BP GE 130 - 139MM HG: CPT | Performed by: NURSE PRACTITIONER

## 2025-04-08 PROCEDURE — 1157F ADVNC CARE PLAN IN RCRD: CPT | Performed by: NURSE PRACTITIONER

## 2025-04-08 PROCEDURE — 1160F RVW MEDS BY RX/DR IN RCRD: CPT | Performed by: NURSE PRACTITIONER

## 2025-04-08 RX ORDER — DOXYCYCLINE 100 MG/1
100 CAPSULE ORAL 2 TIMES DAILY
Qty: 20 CAPSULE | Refills: 0 | Status: SHIPPED | OUTPATIENT
Start: 2025-04-08 | End: 2025-04-18

## 2025-04-08 RX ORDER — BENZONATATE 200 MG/1
200 CAPSULE ORAL 3 TIMES DAILY PRN
Qty: 30 CAPSULE | Refills: 0 | Status: SHIPPED | OUTPATIENT
Start: 2025-04-08 | End: 2025-04-18

## 2025-04-08 NOTE — PATIENT INSTRUCTIONS
Take over-the-counter Mucinex.    Supportive care - encourage clear fluids ( water, Pedialyte, ) , chicken broth/soup and warm fluids can be soothing as well.  Rest, adjust room temperature and humidity.  Use saline spray/drops as needed.  Tylenol or Motrin if needed for fever.   Follow up with PCP if you are not feeling any better.

## 2025-04-08 NOTE — PROGRESS NOTES
Subjective   Patient ID: Wilfred Garces is a 75 y.o. male. They present today with a chief complaint of Nasal Congestion, Laryngitis, Cough, and URI.    History of Present Illness  Patient presents with complaints of cough, congestion, and laryngitis for approximately one week. States his wife was diagnosed with the flu last week. Denies any wheezing and shortness of breath, but states the cough is keeping him up at night. States he's been using tea to help with his missing voice.     Past Medical History  Allergies as of 04/08/2025    (No Known Allergies)       (Not in a hospital admission)       No past medical history on file.    No past surgical history on file.     reports that he has never smoked. He has never used smokeless tobacco.    Review of Systems  Review of Systems     See HPI                          Objective    Vitals:    04/08/25 0823   BP: 135/67   Pulse: 70   Resp: 16   Temp: 36.3 °C (97.3 °F)   SpO2: 98%   Weight: 77.1 kg (170 lb)     No LMP for male patient.    Physical Exam  CONSTITUTIONAL: The general appearance and condition of the patient were examined.  Level of distress, nutrition, external development abnormality, and general behavior were noted.  No abnormal findings.  Vital signs as documented.      ENT: External ears: left ear normal ; right ear normal. Bilateral ears have bulging TM's.  Normal external ear exam.  Bilateral swelling and redness to nasal turbinate's.  Erythema with pebbling to throat.  Enlarged uvula with erythema.     CARDIOVASCULAR: The patient's heart examined for regular rate and rhythm and presence of murmurs. Note taken of any tachycardia, bradycardia or any irregular rhythm.  No abnormal findings.        RESPIRATORY/LUNGS: Chest examined for equal movement, bilaterally.  Lungs examined for equality of breath sounds. Presence or absence of rales noted bilaterally.  Examined for the presence of diffuse or scattered wheezes.  No abnormal findings.       Procedures    Point of Care Test & Imaging Results from this visit  No results found for this visit on 04/08/25.   Imaging  No results found.    Cardiology, Vascular, and Other Imaging  No other imaging results found for the past 2 days      Diagnostic study results (if any) were reviewed by DARIEL Stuart.    Assessment/Plan   Allergies, medications, history, and pertinent labs/EKGs/Imaging reviewed by DARIEL Stuart.     Medical Decision Making  Supportive care - encourage clear fluids ( water, Pedialyte, ) , chicken broth/soup and warm fluids can be soothing as well.  Rest, adjust room temperature and humidity.  Use saline spray/drops as needed.  Tylenol or Motrin if needed for fever.   Follow up with PCP if you are not feeling any better.    At time of discharge patient was clinically well-appearing and HDS for outpatient management. The patient and/or family was educated regarding diagnosis, supportive care, OTC and Rx medications. The patient and/or family was given the opportunity to ask questions prior to discharge.  They verbalized understanding of my discussion of the plans for treatment, expected course, indications to return to  or seek further evaluation in ED, and the need for timely follow up as directed.   They were provided with a work/school excuse if requested.      Orders and Diagnoses  There are no diagnoses linked to this encounter.    Medical Admin Record      Patient disposition: Home    Electronically signed by DARIEL Stuart  8:26 AM

## 2025-04-17 ENCOUNTER — APPOINTMENT (OUTPATIENT)
Dept: PHARMACY | Facility: HOSPITAL | Age: 75
End: 2025-04-17
Payer: MEDICARE

## 2025-04-17 DIAGNOSIS — E11.9 TYPE 2 DIABETES MELLITUS WITHOUT COMPLICATION, WITHOUT LONG-TERM CURRENT USE OF INSULIN: ICD-10-CM

## 2025-04-17 PROCEDURE — RXMED WILLOW AMBULATORY MEDICATION CHARGE

## 2025-04-17 RX ORDER — DAPAGLIFLOZIN 10 MG/1
10 TABLET, FILM COATED ORAL DAILY
Qty: 30 TABLET | Refills: 0 | Status: SHIPPED | OUTPATIENT
Start: 2025-04-17 | End: 2026-04-17

## 2025-04-17 NOTE — PROGRESS NOTES
I reviewed the progress note and agree with the resident’s findings and plans as written. Case discussed with resident.    Cesar Garcia, PharmD

## 2025-04-17 NOTE — PROGRESS NOTES
Patient ID: Wilfred Garces is a 75 y.o. male who presents for Follow-up (diabetes).    Patient was referred to clinical pharmacy for management of type 2 diabetes. At last appointment, patient enrolled in the Medicare Payment Plan to [employer] Farxiga.    Referring provider: Sterling Valentin DO   Subjective   Diabetes Assessment    Diet:    Breakfast: hard boiled egg, toast, banana   Lunch: fruit, scacks   Dinner: home cooked meal   Snacks often: sweets   Drinks: water; occasional beer    Exercise: Exercises extensively. 10 mile bike rides. Golfs often. Discussed ADA recommendation of 150 minutes per week of moderate-intensity exercise     No Known Allergies    Current DM pharmacotherapy:   Metformin 500 mg twice daily    Secondary prevention:  Statin? Yes - atorvastatin 20 mg daily  ACE-I/ARB? No  Aspirin? No    Current monitoring regimen:   Patient is using: continuous glucose monitor    SMBG readings:    Fastin mg/dL    Post-prandial: 177 mg/dL, 164 mg/dL, 175 mg/dL, 99 mg/dL    Any episodes of hypoglycemia? No  Hypoglycemia awareness? Yes    Pertinent PMH review:  PMH of Pancreatitis: No  PMH/FH of Medullary Thyroid Cancer: No  PMH/FH of Multiple Endocrine Neoplasia (MEN) Type II: No  PMH of Retinopathy: No  PMH of Urinary Tract Infections/Yeast Infections: No    Patient goals:  Fasting B - 130 mg/dL  Postprandial BG: less than 180 mg/dL  A1c less than 7%    Drug Interactions:  No significant drug-drug interactions exist that require adjustment to therapy      Medication Review  Current Outpatient Medications   Medication Instructions    alcohol swabs (Alcohol Prep Pads) pads, medicated Use to monitor blood glucose once daily.    ascorbic acid (VITAMIN C) 1,500 mg, Daily    atorvastatin (LIPITOR) 20 mg, oral, Daily    benzonatate (TESSALON) 200 mg, oral, 3 times daily PRN, Do not crush or chew.    Blood glucose monitoring meter Use to monitor blood glucose daily.    blood sugar diagnostic strip Use to  "monitor blood glucose once daily.    dapagliflozin propanediol (FARXIGA) 10 mg, oral, Daily    doxycycline (VIBRAMYCIN) 100 mg, oral, 2 times daily, Take with at least 8 ounces (large glass) of water, do not lie down for 30 minutes after    ferrous sulfate 325 mg, Daily with breakfast    lancets misc Use to monitor blood glucose once daily.    metFORMIN (GLUCOPHAGE) 500 mg, oral, 2 times daily    sildenafil (VIAGRA) 100 mg, oral, Daily PRN      No issues reported in regards to accessibility, adherence, adverse effects, or organization.    Objective   Lab Review  Lab Results   Component Value Date    HGBA1C 8.3 (A) 03/11/2025      Lab Results   Component Value Date    EGFR 84 11/12/2024    EGFR 69 11/07/2023     No components found for: \"UACR\"  Lab Results   Component Value Date    TRIG 94 11/12/2024    CHOL 144 11/12/2024    LDLCALC 70 11/12/2024    HDL 55.5 11/12/2024     Lab Results   Component Value Date    BILITOT 0.6 11/12/2024    CALCIUM 9.6 11/12/2024    CO2 29 11/12/2024     11/12/2024    CREATININE 0.95 11/12/2024    GLUCOSE 160 (H) 11/12/2024    ALKPHOS 92 11/12/2024    K 4.5 11/12/2024    PROT 7.0 11/12/2024     11/12/2024    AST 18 11/12/2024    ALT 22 11/12/2024    BUN 16 11/12/2024    ANIONGAP 15 11/12/2024    MG 2.10 04/18/2019    ALBUMIN 4.4 11/12/2024     BP Readings from Last 3 Encounters:   04/08/25 135/67   03/11/25 116/72   11/12/24 102/66     BMI Readings from Last 1 Encounters:   04/08/25 26.23 kg/m²     The 10-year ASCVD risk score (Rubio BANEGAS, et al., 2019) is: 40.4%    Values used to calculate the score:      Age: 75 years      Sex: Male      Is Non- : No      Diabetic: Yes      Tobacco smoker: No      Systolic Blood Pressure: 135 mmHg      Is BP treated: No      HDL Cholesterol: 55.5 mg/dL      Total Cholesterol: 144 mg/dL    PATIENT EDUCATION/GOALS  Counseled patient on Farxiga MOA, expectations, side effects, duration of therapy, administration, and " monitoring parameters.  Reviewed the benefits of SGLT-2i therapy, such as glycemic control and kidney and CV protection.  Advised patient to practice proper  hygiene to reduce risk of UTIs or yeast infections.  Advised patient to maintain adequate fluid intake to remain hydrated while on SGLT2i therapy.  Answered all patient questions and concerns.   Counseled patient on MOA, expectations, side effects, duration of therapy, contraindications, administration, and monitoring parameters  Answered all patient questions and concerns; provided [facility] phone number if issues/questions arise    Assessment/Plan   Problem List Items Addressed This Visit       Diabetes mellitus (Multi)    Relevant Medications    dapagliflozin propanediol (Farxiga) 10 mg tablet    Other Relevant Orders    Referral to Clinical Pharmacy     Type 2 diabetes mellitus is not at goal.   Current A1C: 8.3% (3/11/2025)  Goal A1C: <7%    Discussion: Wilfred is doing well at this time. No issues with Farxiga at this time - no dysuria or s/sx of UTI. Reported BG values are now below goal; patient-calculated average glucose results in estimated A1c of 7.1%. Appropriate to increase Farxiga to 10 mg daily at this time to push further within goal. Patient in agreement. No questions at this time.  Plan:   Increase:   Farxiga to 10 mg once daily in the morning  Continue:   Metformin  mg twice daily  Follow-up: 4 weeks; 5/15 @ 11am  Future considerations: tolerance of Farxiga  Refills: Not needed    Miky Garcia, PharmD  PGY-1 Pharmacy Resident

## 2025-04-22 ENCOUNTER — PHARMACY VISIT (OUTPATIENT)
Dept: PHARMACY | Facility: CLINIC | Age: 75
End: 2025-04-22
Payer: COMMERCIAL

## 2025-05-15 ENCOUNTER — APPOINTMENT (OUTPATIENT)
Dept: PHARMACY | Facility: HOSPITAL | Age: 75
End: 2025-05-15
Payer: MEDICARE

## 2025-05-15 DIAGNOSIS — E11.9 TYPE 2 DIABETES MELLITUS WITHOUT COMPLICATION, WITHOUT LONG-TERM CURRENT USE OF INSULIN: ICD-10-CM

## 2025-05-15 PROCEDURE — RXMED WILLOW AMBULATORY MEDICATION CHARGE

## 2025-05-15 RX ORDER — DAPAGLIFLOZIN 10 MG/1
10 TABLET, FILM COATED ORAL DAILY
Qty: 90 TABLET | Refills: 2 | Status: SHIPPED | OUTPATIENT
Start: 2025-05-15 | End: 2026-02-09

## 2025-05-15 NOTE — PROGRESS NOTES
Patient ID: Wilfred Garces is a 75 y.o. male referred to clinical pharmacy for Diabetes management. This is a follow-up visit.    At last pharmacy appointment, Farxiga was increased to 10 mg once daily    Referring provider: Sterling Valentin, DO   Subjective   Diabetes Assessment    Diet: at least 2 meals per day   Breakfast: hard boiled egg, toast, banana   Lunch: fruit, scacks if anything   Dinner: home cooked meal (largest meal)   Snacks often: sweets   Drinks: water; occasional beer    Exercise: Exercises extensively. 10 mile bike rides. Golfs often. Discussed ADA recommendation of 150 minutes per week of moderate-intensity exercise.     No Known Allergies    Current DM pharmacotherapy:   Metformin 500 mg twice daily  Farxiga 10 mg daily    Secondary prevention:  Statin? Yes - atorvastatin 20 mg daily  ACE-I/ARB? No  Aspirin? No    Current monitoring regimen:   Patient is using: glucometer    Any episodes of hypoglycemia? No  Hypoglycemia awareness? Yes    Pertinent co-morbidities:  Chronic Kidney Disease: No  Liver Disease: No  Heart Failure: No  Cardiovascular Disease: No  Previous stroke: No  Previous MI: No  Sleep Apnea: No     Pertinent PMH review:  PMH of Pancreatitis: No  PMH/FH of Medullary Thyroid Cancer: No  PMH/FH of Multiple Endocrine Neoplasia (MEN) Type II: No  PMH of Retinopathy: No  PMH of Urinary Tract Infections/Yeast Infections: No    Patient goals:  Fasting B - 130 mg/dL  Postprandial BG: less than 180 mg/dL  A1c less than 7%    Drug Interactions:  No significant drug-drug interactions exist that require adjustment to therapy      Medication Review  Current Outpatient Medications   Medication Instructions    alcohol swabs (Alcohol Prep Pads) pads, medicated Use to monitor blood glucose once daily.    ascorbic acid (VITAMIN C) 1,500 mg, Daily    atorvastatin (LIPITOR) 20 mg, oral, Daily    Blood glucose monitoring meter Use to monitor blood glucose daily.    blood sugar diagnostic Use to  "monitor blood glucose once daily.    dapagliflozin propanediol (FARXIGA) 10 mg, oral, Daily    ferrous sulfate 325 mg, Daily with breakfast    lancets misc Use to monitor blood glucose once daily.    metFORMIN (GLUCOPHAGE) 500 mg, oral, 2 times daily    sildenafil (VIAGRA) 100 mg, oral, Daily PRN      No issues reported in regards to accessibility, adherence, adverse effects, or organization.    Objective   Lab Review  Lab Results   Component Value Date    HGBA1C 8.3 (A) 03/11/2025      Lab Results   Component Value Date    EGFR 84 11/12/2024    EGFR 69 11/07/2023     No components found for: \"UACR\"  Lab Results   Component Value Date    TRIG 94 11/12/2024    CHOL 144 11/12/2024    LDLCALC 70 11/12/2024    HDL 55.5 11/12/2024     Lab Results   Component Value Date    BILITOT 0.6 11/12/2024    CALCIUM 9.6 11/12/2024    CO2 29 11/12/2024     11/12/2024    CREATININE 0.95 11/12/2024    GLUCOSE 160 (H) 11/12/2024    ALKPHOS 92 11/12/2024    K 4.5 11/12/2024    PROT 7.0 11/12/2024     11/12/2024    AST 18 11/12/2024    ALT 22 11/12/2024    BUN 16 11/12/2024    ANIONGAP 15 11/12/2024    MG 2.10 04/18/2019    ALBUMIN 4.4 11/12/2024     BP Readings from Last 3 Encounters:   04/08/25 135/67   03/11/25 116/72   11/12/24 102/66     BMI Readings from Last 1 Encounters:   04/08/25 26.23 kg/m²     The 10-year ASCVD risk score (Rubio BANEGAS, et al., 2019) is: 40.4%    Values used to calculate the score:      Age: 75 years      Sex: Male      Is Non- : No      Diabetic: Yes      Tobacco smoker: No      Systolic Blood Pressure: 135 mmHg      Is BP treated: No      HDL Cholesterol: 55.5 mg/dL      Total Cholesterol: 144 mg/dL    PATIENT EDUCATION/GOALS  Counseled patient on Farxiga MOA, expectations, side effects, duration of therapy, administration, and monitoring parameters.  Reviewed the benefits of SGLT-2i therapy, such as glycemic control and kidney and CV protection.  Advised patient to practice " proper  hygiene to reduce risk of UTIs or yeast infections.  Advised patient to maintain adequate fluid intake to remain hydrated while on SGLT2i therapy.  Answered all patient questions and concerns.   Counseled patient on MOA, expectations, side effects, duration of therapy, contraindications, administration, and monitoring parameters  Answered all patient questions and concerns; provided [facility] phone number if issues/questions arise    Assessment/Plan   Problem List Items Addressed This Visit       Diabetes mellitus (Multi)    Relevant Medications    dapagliflozin propanediol (Farxiga) 10 mg tablet    blood sugar diagnostic    Other Relevant Orders    Referral to Clinical Pharmacy     Type 2 diabetes mellitus is not at goal.   Current A1C: 8.3% (3/11/2025)  Goal A1C: <7%    Discussion: Wilfred is doing well at this time. No issues with Farxiga - no dysuria or s/sx of UTI. Patient averaged glucose readings and calculated estimated A1c at 7%. Discussed taking glucose readings at set times: 2 times per week in the morning, before meals, and once per week 2 hours after dinner to ensure consistency. Follow-up in 4 weeks to assess readings.  Plan:   Continue:   Metformin  mg twice daily  Farxiga 10 mg once daily in the morning  Follow-up: 4 weeks; 6/12 @ 11am  Future considerations: A1c; switch to metformin XR and increase to max dose. Addition of pioglitazone.  Refills: Yes, Mason Garcia, PharmD  PGY-1 Pharmacy Resident

## 2025-05-20 ENCOUNTER — PHARMACY VISIT (OUTPATIENT)
Dept: PHARMACY | Facility: CLINIC | Age: 75
End: 2025-05-20
Payer: COMMERCIAL

## 2025-06-12 ENCOUNTER — APPOINTMENT (OUTPATIENT)
Dept: PHARMACY | Facility: HOSPITAL | Age: 75
End: 2025-06-12
Payer: MEDICARE

## 2025-06-12 DIAGNOSIS — E11.9 TYPE 2 DIABETES MELLITUS WITHOUT COMPLICATION, WITHOUT LONG-TERM CURRENT USE OF INSULIN: ICD-10-CM

## 2025-06-12 NOTE — PROGRESS NOTES
Patient ID: Wilfred Garces is a 75 y.o. male referred to clinical pharmacy for Diabetes management. This is a follow-up visit.    At last pharmacy appointment, no changes were made to Wilfred's diabetes regimen.    Referring provider: Sterling Valentin, DO   Subjective   Diabetes Assessment    Diet: at least 2 meals per day   Breakfast: hard boiled egg, toast, banana   Lunch: fruit, scacks if anything   Dinner: home cooked meal (largest meal)   Snacks often: sweets   Drinks: water; occasional beer    Exercise: Exercises extensively. 10 mile bike rides. Golfs often. Discussed ADA recommendation of 150 minutes per week of moderate-intensity exercise.    No Known Allergies    Current DM pharmacotherapy:   Metformin 500 mg twice daily  Farxiga 10 mg daily    Secondary prevention:  Statin? Yes - atorvastatin 20 mg daily  ACE-I/ARB? No  Aspirin? No    Current monitoring regimen:   Patient is using: glucometer    Fastin, 154, 159, 127, 127, 168 mg/dL  2-hours post-prandial: 214, 199 mg/dL  Calculated estimated A1c = 6.9%    Any episodes of hypoglycemia? No  Hypoglycemia awareness? Yes    Pertinent co-morbidities:  Chronic Kidney Disease: No  Liver Disease: No  Heart Failure: No  Cardiovascular Disease: No  Previous stroke: No  Previous MI: No  Sleep Apnea: No     Pertinent PMH review:  PMH of Pancreatitis: No  PMH/FH of Medullary Thyroid Cancer: No  PMH/FH of Multiple Endocrine Neoplasia (MEN) Type II: No  PMH of Retinopathy: No  PMH of Urinary Tract Infections/Yeast Infections: No    Patient goals:  Fasting B - 130 mg/dL  Postprandial BG: less than 180 mg/dL  A1c less than 7%    Drug Interactions:  No significant drug-drug interactions exist that require adjustment to therapy      Medication Review  Current Outpatient Medications   Medication Instructions    alcohol swabs (Alcohol Prep Pads) pads, medicated Use to monitor blood glucose once daily.    ascorbic acid (VITAMIN C) 1,500 mg, Daily    atorvastatin  "(LIPITOR) 20 mg, oral, Daily    Blood glucose monitoring meter Use to monitor blood glucose daily.    blood sugar diagnostic Use to monitor blood glucose once daily.    Farxiga 10 mg, oral, Daily    ferrous sulfate 325 mg, Daily with breakfast    lancets misc Use to monitor blood glucose once daily.    metFORMIN (GLUCOPHAGE) 500 mg, oral, 2 times daily    sildenafil (VIAGRA) 100 mg, oral, Daily PRN      No issues reported in regards to accessibility, adherence, adverse effects, or organization.    Objective   Lab Review  Lab Results   Component Value Date    HGBA1C 8.3 (A) 03/11/2025      Lab Results   Component Value Date    EGFR 84 11/12/2024    EGFR 69 11/07/2023     No components found for: \"UACR\"    Lab Results   Component Value Date    TRIG 94 11/12/2024    CHOL 144 11/12/2024    LDLCALC 70 11/12/2024    HDL 55.5 11/12/2024     Lab Results   Component Value Date    BILITOT 0.6 11/12/2024    CALCIUM 9.6 11/12/2024    CO2 29 11/12/2024     11/12/2024    CREATININE 0.95 11/12/2024    GLUCOSE 160 (H) 11/12/2024    ALKPHOS 92 11/12/2024    K 4.5 11/12/2024    PROT 7.0 11/12/2024     11/12/2024    AST 18 11/12/2024    ALT 22 11/12/2024    BUN 16 11/12/2024    ANIONGAP 15 11/12/2024    MG 2.10 04/18/2019    ALBUMIN 4.4 11/12/2024     BP Readings from Last 3 Encounters:   04/08/25 135/67   03/11/25 116/72   11/12/24 102/66     BMI Readings from Last 1 Encounters:   04/08/25 26.23 kg/m²     The 10-year ASCVD risk score (Rubio BANEGAS, et al., 2019) is: 40.4%    Values used to calculate the score:      Age: 75 years      Sex: Male      Is Non- : No      Diabetic: Yes      Tobacco smoker: No      Systolic Blood Pressure: 135 mmHg      Is BP treated: No      HDL Cholesterol: 55.5 mg/dL      Total Cholesterol: 144 mg/dL    PATIENT EDUCATION/GOALS  Counseled patient on Farxiga MOA, expectations, side effects, duration of therapy, administration, and monitoring parameters.  Reviewed the " benefits of SGLT-2i therapy, such as glycemic control and kidney and CV protection.  Advised patient to practice proper  hygiene to reduce risk of UTIs or yeast infections.  Advised patient to maintain adequate fluid intake to remain hydrated while on SGLT2i therapy.  Answered all patient questions and concerns.   Counseled patient on MOA, expectations, side effects, duration of therapy, contraindications, administration, and monitoring parameters  Answered all patient questions and concerns; provided [facility] phone number if issues/questions arise    Assessment/Plan   Problem List Items Addressed This Visit       Diabetes mellitus (Multi)    Relevant Orders    Hemoglobin A1c    Basic metabolic panel    Referral to Clinical Pharmacy     Type 2 diabetes mellitus is not at goal.   Current A1C: 8.3% (3/11/2025)  Goal A1C: <7%    Discussion: Wilfred is doing well at this time. Continues to have no issues with Farxiga. Patient averaged glucose readings and calculated estimated A1c at 6.9% - reported fasting values alternate above and below goal depending on what Wilfred eats at night. Discussed options - dose increase to metformin & switch to XR versus defer changes. Wilfred would prefer to defer changes until after A1c drawn. Ordered A1c; BMP to assess renal function s/p Farxiga initiation.  Plan:   Continue:   Metformin  mg twice daily  Farxiga 10 mg once daily in the morning  Follow-up: 4 weeks; 7/15 @ 11am  Future considerations: A1c; switch to metformin XR and increase to max dose. Addition of pioglitazone.  Refills: Not needed    Miky Garcia, PharmD  PGY-1 Pharmacy Resident

## 2025-06-14 LAB
ANION GAP SERPL CALCULATED.4IONS-SCNC: 11 MMOL/L (CALC) (ref 7–17)
BUN SERPL-MCNC: 26 MG/DL (ref 7–25)
BUN/CREAT SERPL: 26 (CALC) (ref 6–22)
CALCIUM SERPL-MCNC: 8.9 MG/DL (ref 8.6–10.3)
CHLORIDE SERPL-SCNC: 105 MMOL/L (ref 98–110)
CO2 SERPL-SCNC: 23 MMOL/L (ref 20–32)
CREAT SERPL-MCNC: 1.01 MG/DL (ref 0.7–1.28)
EGFRCR SERPLBLD CKD-EPI 2021: 78 ML/MIN/1.73M2
EST. AVERAGE GLUCOSE BLD GHB EST-MCNC: 174 MG/DL
EST. AVERAGE GLUCOSE BLD GHB EST-SCNC: 9.7 MMOL/L
GLUCOSE SERPL-MCNC: 130 MG/DL (ref 65–99)
HBA1C MFR BLD: 7.7 %
POTASSIUM SERPL-SCNC: 4.4 MMOL/L (ref 3.5–5.3)
SODIUM SERPL-SCNC: 139 MMOL/L (ref 135–146)

## 2025-06-14 NOTE — RESULT ENCOUNTER NOTE
Your HGA1c was 7.7.  This is slowly improving.  Kidney function appears fine.  We can increase and change the metformin to the extended release or start another medication to get this under 7

## 2025-06-26 ENCOUNTER — APPOINTMENT (OUTPATIENT)
Dept: PODIATRY | Facility: CLINIC | Age: 75
End: 2025-06-26
Payer: MEDICARE

## 2025-06-26 DIAGNOSIS — M21.41 PES PLANUS OF BOTH FEET: ICD-10-CM

## 2025-06-26 DIAGNOSIS — E11.9 ENCOUNTER FOR DIABETIC FOOT EXAM (MULTI): ICD-10-CM

## 2025-06-26 DIAGNOSIS — R73.09 ELEVATED HEMOGLOBIN A1C: Primary | ICD-10-CM

## 2025-06-26 DIAGNOSIS — E11.9 TYPE 2 DIABETES MELLITUS WITHOUT COMPLICATION, WITH LONG-TERM CURRENT USE OF INSULIN: ICD-10-CM

## 2025-06-26 DIAGNOSIS — M19.071 OSTEOARTHRITIS OF RIGHT MIDFOOT: ICD-10-CM

## 2025-06-26 DIAGNOSIS — M79.672 FOOT PAIN, LEFT: ICD-10-CM

## 2025-06-26 DIAGNOSIS — Z79.4 TYPE 2 DIABETES MELLITUS WITHOUT COMPLICATION, WITH LONG-TERM CURRENT USE OF INSULIN: ICD-10-CM

## 2025-06-26 DIAGNOSIS — M19.072 DEGENERATIVE JOINT DISEASE OF LEFT MIDFOOT: ICD-10-CM

## 2025-06-26 DIAGNOSIS — M21.42 PES PLANUS OF BOTH FEET: ICD-10-CM

## 2025-06-26 PROCEDURE — 1036F TOBACCO NON-USER: CPT | Performed by: PODIATRIST

## 2025-06-26 PROCEDURE — 3052F HG A1C>EQUAL 8.0%<EQUAL 9.0%: CPT | Performed by: PODIATRIST

## 2025-06-26 PROCEDURE — 1160F RVW MEDS BY RX/DR IN RCRD: CPT | Performed by: PODIATRIST

## 2025-06-26 PROCEDURE — 1159F MED LIST DOCD IN RCRD: CPT | Performed by: PODIATRIST

## 2025-06-26 PROCEDURE — 99203 OFFICE O/P NEW LOW 30 MIN: CPT | Performed by: PODIATRIST

## 2025-06-26 NOTE — PROGRESS NOTES
History Of Present Illness  Wilfred Garces is a 75 y.o. male presenting with chief complaint of: foot pain, better today.    PCP Sterling Valentin DO  Last visit 3/11/25     Past Medical History  He has no past medical history on file.    Surgical History  He has no past surgical history on file.     Social History  He reports that he has never smoked. He has never used smokeless tobacco. No history on file for alcohol use and drug use.    Family History  Family History[1]     Allergies  Patient has no known allergies.    Medications  Current Medications[2]    Review of Systems    REVIEW OF SYSTEMS  GENERAL:  Negative for malaise, significant weight loss, fever  CARDIOVASCULAR: leg swelling   MUSCULOSKELETAL:  Negative for joint pain or swelling, back pain, and muscle pain.  SKIN:  Negative for lesions, rash, and itching  PSYCH:  Negative for sleep disturbance, mood disorder and recent psychosocial stressors  NEURO: Negative, denies any burning, tingling or numbness     Objective:   Vasc: DP and PT pulses are palpable bilateral.  CFT is less than 3 seconds bilateral.  Skin temperature is warm to cool proximal to distal bilateral.      Neuro:  Light touch is intact to the foot bilateral.  Protective sensation is intact to the foot when tested with the 5.07 SWM bilateral.  There is no clonus noted.  The hallux is downgoing bilateral.      Derm: Nails are normal. Skin is supple with normal texture and turgor noted.  Webspaces are clean, dry and intact bilateral.  There are no hyperkeratoses, ulcerations, verruca or other lesions noted.    Patient does have calluses present on the left foot subnavicular and first metatarsal head.  Ortho: Muscle strength is 5/5 for all pedal groups tested.  Ankle joint, subtalar joint, 1st MPJ and lesser MPJ ROM is full and without pain or crepitus.  The foot type is rectus bilateral off weight bearing.  Patient has an abnormal shape foot.  He has severe pes planus.  He has mild bunion  deformity.  He has long second toes.  There is some digital clawing.  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    Assessment/Plan     Diagnoses and all orders for this visit:  Elevated hemoglobin A1c  Foot pain, left  -     Referral to Podiatry  Type 2 diabetes mellitus without complication, with long-term current use of insulin  Encounter for diabetic foot exam (Multi)  Degenerative joint disease of left midfoot  Osteoarthritis of right midfoot  Pes planus of both feet      Feel best option for this patient at this time is to get a custom fit pair of orthotics.  This should help offload his abnormalities.  If this does not work, we could consider an AFO.                    [1] No family history on file.  [2]   Current Outpatient Medications   Medication Sig Dispense Refill    alcohol swabs (Alcohol Prep Pads) pads, medicated Use to monitor blood glucose once daily. 100 each 1    ascorbic acid (Vitamin C) 500 mg tablet Take 3 tablets (1,500 mg) by mouth once daily.      atorvastatin (Lipitor) 20 mg tablet TAKE 1 TABLET DAILY 90 tablet 3    Blood glucose monitoring meter Use to monitor blood glucose daily. 1 each 0    blood sugar diagnostic Use to monitor blood glucose once daily. 100 each 2    dapagliflozin propanediol (Farxiga) 10 mg tablet Take 1 tablet (10 mg) by mouth once daily. 90 tablet 2    ferrous sulfate, 325 mg ferrous sulfate, tablet Take 1 tablet by mouth once daily with breakfast.      lancets misc Use to monitor blood glucose once daily. 100 each 1    metFORMIN (Glucophage) 500 mg tablet Take 1 tablet (500 mg) by mouth 2 times a day. 180 tablet 1    sildenafil (Viagra) 100 mg tablet Take 1 tablet (100 mg) by mouth once daily as needed for erectile dysfunction. 10 tablet 1     No current facility-administered medications for this visit.

## 2025-07-15 ENCOUNTER — APPOINTMENT (OUTPATIENT)
Dept: PHARMACY | Facility: HOSPITAL | Age: 75
End: 2025-07-15
Payer: MEDICARE

## 2025-07-15 DIAGNOSIS — E11.9 TYPE 2 DIABETES MELLITUS WITHOUT COMPLICATION, WITHOUT LONG-TERM CURRENT USE OF INSULIN: ICD-10-CM

## 2025-07-15 PROCEDURE — RXMED WILLOW AMBULATORY MEDICATION CHARGE

## 2025-07-15 RX ORDER — METFORMIN HYDROCHLORIDE 500 MG/1
TABLET, EXTENDED RELEASE ORAL
Qty: 353 TABLET | Refills: 0 | Status: SHIPPED | OUTPATIENT
Start: 2025-07-15 | End: 2025-10-13

## 2025-07-15 NOTE — PROGRESS NOTES
Clinical Pharmacy Visit    Patient ID: Wilfred Garces is a 75 y.o. male referred to clinical pharmacy for Diabetes management. This is a follow-up visit.    At last pharmacy appointment, no changes were made to Wilfred's diabetes regimen.    Referring provider: Sterling Valentin, DO    Subjective   Diabetes Assessment    Diet: at least 2 meals per day   Breakfast: hard boiled egg, toast, banana   Lunch: fruit, snacks if anything   Dinner: home cooked meal (largest meal)   Snacks often: sweets   Drinks: water; occasional beer    Exercise: Exercises extensively. 10 mile bike rides. Golfs often. Discussed ADA recommendation of 150 minutes per week of moderate-intensity exercise.    No Known Allergies    Current DM pharmacotherapy:   Metformin 500 mg twice daily  Farxiga 10 mg daily    Secondary prevention:  Statin? Yes - atorvastatin 20 mg daily  ACE-I/ARB? No  Aspirin? No    Current monitoring regimen:   Patient is using: glucometer    Fastin, 135, 140 mg/dL  2-hours post-prandial: 185, 190 mg/dL    Any episodes of hypoglycemia? No  Hypoglycemia awareness? Yes    Pertinent co-morbidities:  Chronic Kidney Disease: No  Liver Disease: No  Heart Failure: No  Cardiovascular Disease: No  Previous stroke: No  Previous MI: No  Sleep Apnea: No     Pertinent PMH review:  PMH of Pancreatitis: No  PMH/FH of Medullary Thyroid Cancer: No  PMH/FH of Multiple Endocrine Neoplasia (MEN) Type II: No  PMH of Retinopathy: No  PMH of Urinary Tract Infections/Yeast Infections: No    Patient goals:  Fasting B - 130 mg/dL  Postprandial BG: less than 180 mg/dL  A1c less than 7%    Drug Interactions:  No significant drug-drug interactions exist that require adjustment to therapy      Medication Review  Current Outpatient Medications   Medication Instructions    alcohol swabs (Alcohol Prep Pads) pads, medicated Use to monitor blood glucose once daily.    ascorbic acid (VITAMIN C) 1,500 mg, Daily    atorvastatin (LIPITOR) 20 mg, oral,  "Daily    Blood glucose monitoring meter Use to monitor blood glucose daily.    blood sugar diagnostic Use to monitor blood glucose once daily.    Farxiga 10 mg, oral, Daily    ferrous sulfate 325 mg, Daily with breakfast    lancets misc Use to monitor blood glucose once daily.    metFORMIN XR (Glucophage-XR) 500 mg 24 hr tablet Take 3 tablets (1,500 mg) by mouth once daily for 7 days, THEN 4 tablets (2,000 mg) once daily. Do not crush, chew, or split.    sildenafil (VIAGRA) 100 mg, oral, Daily PRN      No issues reported in regards to accessibility, adherence, adverse effects, or organization.    Objective   Lab Review  Lab Results   Component Value Date    HGBA1C 7.7 (H) 06/13/2025      Lab Results   Component Value Date    EGFR 78 06/13/2025    EGFR 84 11/12/2024    EGFR 69 11/07/2023     No components found for: \"UACR\"    Lab Results   Component Value Date    TRIG 94 11/12/2024    CHOL 144 11/12/2024    LDLCALC 70 11/12/2024    HDL 55.5 11/12/2024     Lab Results   Component Value Date    BILITOT 0.6 11/12/2024    CALCIUM 8.9 06/13/2025    CO2 23 06/13/2025     06/13/2025    CREATININE 1.01 06/13/2025    GLUCOSE 130 (H) 06/13/2025    ALKPHOS 92 11/12/2024    K 4.4 06/13/2025    PROT 7.0 11/12/2024     06/13/2025    AST 18 11/12/2024    ALT 22 11/12/2024    BUN 26 (H) 06/13/2025    ANIONGAP 11 06/13/2025    MG 2.10 04/18/2019    ALBUMIN 4.4 11/12/2024     BP Readings from Last 3 Encounters:   04/08/25 135/67   03/11/25 116/72   11/12/24 102/66     BMI Readings from Last 1 Encounters:   04/08/25 26.23 kg/m²     The 10-year ASCVD risk score (Rubio BANEGAS, et al., 2019) is: 40.4%    Values used to calculate the score:      Age: 75 years      Sex: Male      Is Non- : No      Diabetic: Yes      Tobacco smoker: No      Systolic Blood Pressure: 135 mmHg      Is BP treated: No      HDL Cholesterol: 55.5 mg/dL      Total Cholesterol: 144 mg/dL    PATIENT EDUCATION/GOALS  Counseled patient " on Farxiga MOA, expectations, side effects, duration of therapy, administration, and monitoring parameters.  Reviewed the benefits of SGLT-2i therapy, such as glycemic control and kidney and CV protection.  Advised patient to practice proper  hygiene to reduce risk of UTIs or yeast infections.  Advised patient to maintain adequate fluid intake to remain hydrated while on SGLT2i therapy.  Answered all patient questions and concerns.   Counseled patient on MOA, expectations, side effects, duration of therapy, contraindications, administration, and monitoring parameters  Answered all patient questions and concerns; provided [facility] phone number if issues/questions arise    Assessment/Plan   Problem List Items Addressed This Visit       Diabetes mellitus (Multi)    Relevant Medications    metFORMIN XR (Glucophage-XR) 500 mg 24 hr tablet    Other Relevant Orders    Referral to Clinical Pharmacy     Type 2 diabetes mellitus is not at goal.   Current A1C: 7.7% (6/13/25)  Goal A1C: <7%    Discussion: Wilfred is doing well at this time. Continues to have no issues with Farxiga. A1c has improved 8.3 to 7.7%. Reported BG values alternate above and below goal, one great reading of 104 mg/dL in the morning. Discussed options - Wilfred is agreeable to switch to metformin XR with dose increase. Prefers it to be shipped. Discussed adverse effects. Wilfred to reach out with any questions or concerns.  Plan:   Increase:  Metformin (IR -> XR)  3 tablets per day for 7 days THEN increase to 4 tablets per day thereafter   Continue:   Farxiga 10 mg once daily in the morning  Follow-up: 4 weeks; 8/12 @ 10:40 am  Future considerations: tolerance of metformin XR  Refills: Not needed    Miky Garcia, PharmD  Clinical Pharmacy Specialist  Primary Care

## 2025-07-31 ENCOUNTER — PHARMACY VISIT (OUTPATIENT)
Dept: PHARMACY | Facility: CLINIC | Age: 75
End: 2025-07-31
Payer: COMMERCIAL

## 2025-08-12 ENCOUNTER — APPOINTMENT (OUTPATIENT)
Dept: PHARMACY | Facility: HOSPITAL | Age: 75
End: 2025-08-12
Payer: MEDICARE

## 2025-08-12 DIAGNOSIS — E11.9 TYPE 2 DIABETES MELLITUS WITHOUT COMPLICATION, WITHOUT LONG-TERM CURRENT USE OF INSULIN: ICD-10-CM

## 2025-08-12 RX ORDER — METFORMIN HYDROCHLORIDE 500 MG/1
1000 TABLET, EXTENDED RELEASE ORAL
Qty: 360 TABLET | Refills: 2 | Status: SHIPPED | OUTPATIENT
Start: 2025-08-12

## 2025-08-21 ENCOUNTER — PHARMACY VISIT (OUTPATIENT)
Dept: PHARMACY | Facility: CLINIC | Age: 75
End: 2025-08-21
Payer: COMMERCIAL

## 2025-08-21 PROCEDURE — RXMED WILLOW AMBULATORY MEDICATION CHARGE

## 2025-11-14 ENCOUNTER — APPOINTMENT (OUTPATIENT)
Dept: PRIMARY CARE | Facility: CLINIC | Age: 75
End: 2025-11-14
Payer: MEDICARE

## 2025-11-17 ENCOUNTER — APPOINTMENT (OUTPATIENT)
Dept: PHARMACY | Facility: HOSPITAL | Age: 75
End: 2025-11-17
Payer: MEDICARE